# Patient Record
Sex: MALE | Race: WHITE | URBAN - METROPOLITAN AREA
[De-identification: names, ages, dates, MRNs, and addresses within clinical notes are randomized per-mention and may not be internally consistent; named-entity substitution may affect disease eponyms.]

---

## 2020-12-14 ENCOUNTER — OFFICE VISIT (OUTPATIENT)
Dept: FAMILY MEDICINE CLINIC | Age: 30
End: 2020-12-14
Payer: COMMERCIAL

## 2020-12-14 ENCOUNTER — HOSPITAL ENCOUNTER (OUTPATIENT)
Age: 30
Setting detail: SPECIMEN
Discharge: HOME | End: 2020-12-14
Payer: COMMERCIAL

## 2020-12-14 VITALS
HEART RATE: 78 BPM | TEMPERATURE: 97 F | BODY MASS INDEX: 32.27 KG/M2 | WEIGHT: 265 LBS | HEIGHT: 76 IN | OXYGEN SATURATION: 97 %

## 2020-12-14 PROCEDURE — 99214 OFFICE O/P EST MOD 30 MIN: CPT | Performed by: PHYSICIAN ASSISTANT

## 2020-12-14 ASSESSMENT — ENCOUNTER SYMPTOMS
SINUS PAIN: 0
VOMITING: 0
EYES NEGATIVE: 1
ABDOMINAL PAIN: 0
SWOLLEN GLANDS: 0
VISUAL CHANGE: 0
CHANGE IN BOWEL HABIT: 0
SHORTNESS OF BREATH: 0
CHEST TIGHTNESS: 0
NAUSEA: 0
SORE THROAT: 0
SINUS PRESSURE: 0
COUGH: 1

## 2020-12-14 ASSESSMENT — PATIENT HEALTH QUESTIONNAIRE - PHQ9
SUM OF ALL RESPONSES TO PHQ9 QUESTIONS 1 & 2: 0
SUM OF ALL RESPONSES TO PHQ QUESTIONS 1-9: 0
SUM OF ALL RESPONSES TO PHQ QUESTIONS 1-9: 0
1. LITTLE INTEREST OR PLEASURE IN DOING THINGS: 0
2. FEELING DOWN, DEPRESSED OR HOPELESS: 0
SUM OF ALL RESPONSES TO PHQ QUESTIONS 1-9: 0

## 2020-12-14 NOTE — PROGRESS NOTES
[address] [place] [address]  [place] [address]  Phone: [phone]  Fax: [phone] [facility] Name: Katie Blackmon  MRN: [id number]  [place] 1990  Date of evaluation: 12/14/2020  Provider: Tangela Gomez, [facility]       Chief Complaint   Patient presents with    Covid Testing     fever body aches chills pressure in chest and cough X 2 days            HISTORY OF PRESENT ILLNESS  (Location/Symptom, Timing/Onset, Context/Setting, Quality, Duration, Modifying Factors, Severity.)   Katie Blackmon is a 35 y.o. White [1] male who presents to the office for evaluation of      Generalized Body Aches  This is a new problem. The current episode started in the past 7 days. The problem occurs daily. The problem has been waxing and waning. Associated symptoms include chills, congestion, coughing, fatigue and myalgias. Pertinent negatives include no abdominal pain, anorexia, arthralgias, change in bowel habit, chest pain, diaphoresis, fever, headaches, joint swelling, nausea, neck pain, numbness, rash, sore throat, swollen glands, urinary symptoms, vertigo, visual change, vomiting or weakness. Nothing aggravates the symptoms. Nursing Notes were reviewed. REVIEW OF SYSTEMS    (2-9 systems for level 4, 10 or more for level 5)     Review of Systems   Constitutional: Positive for chills and fatigue. Negative for diaphoresis and fever. HENT: Positive for congestion and postnasal drip. Negative for ear discharge, ear pain, sinus pressure, sinus pain and sore throat. Eyes: Negative. Respiratory: Positive for cough. Negative for chest tightness and shortness of breath. Cardiovascular: Negative for chest pain. Gastrointestinal: Negative for abdominal pain, anorexia, change in bowel habit, nausea and vomiting. Genitourinary: Negative. Musculoskeletal: Positive for myalgias. Negative for arthralgias, joint swelling and neck pain. Skin: Negative for rash. Neurological: Negative for vertigo, weakness, numbness and headaches. Except as noted above the remainder of the review of systems was reviewed andnegative. PAST MEDICAL HISTORY   History reviewed. Past Medical History:   Diagnosis Date    Anemia     Asthma     [facility]omy[facility] ([facility] [address] [facility]     Embolism ([facility] [address] [facility]     pulmonary         SURGICAL HISTORY     History reviewed. Past Surgical History:   Procedure Laterality Date    ANTERIOR CRUCIATE LIGAMENT REPAIR      TONSILLECTOMY           CURRENT MEDICATIONS       Current Outpatient Medications   Medication Sig Dispense Refill    albuterol sulfate  (90 BASE) MCG/ACT inhaler Inhale 2 puffs into the lungs every 6 hours as needed for Wheezing      butalbital-acetaminophen-caffeine (FIORICET) -40 MG per tablet Take 1 tablet by mouth every 6 hours as needed for Headaches 20 tablet 0     No current facility-administered medications for this visit. ALLERGIES     Dilaudid [hydromorphone]    FAMILY HISTORY     No family history on file. No family status information on file. SOCIAL HISTORY      reports that he has quit smoking. His smokeless tobacco use includes chew. He reports that he does not drink alcohol or use drugs. PHYSICAL EXAM    (up to 7 for level 4, 8 or more for level 5)     Vitals:    12/14/20 1418   Pulse: 78   Temp: 97 °F (36.1 °C)   TempSrc: Tympanic   SpO2: 97%   Weight: 265 lb (120.2 kg)   Height: 6' 4\" (1.93 m)         Physical Exam  Vitals signs and nursing note reviewed. Constitutional:       Appearance: Normal appearance. HENT:      Head: Normocephalic and atraumatic. Right Ear: External ear normal.      Left Ear: External ear normal.      Nose: Congestion present.       Mouth/Throat:      Mouth: Mucous membranes are moist. Pharynx: Posterior oropharyngeal erythema present. Eyes:      Extraocular Movements: Extraocular movements intact. Conjunctiva/sclera: Conjunctivae normal.      Pupils: Pupils are equal, round, and reactive to light. Cardiovascular:      Rate and Rhythm: Normal rate and regular rhythm. Pulses: Normal pulses. Pulmonary:      Effort: Pulmonary effort is normal.   Abdominal:      Palpations: Abdomen is soft. Skin:     General: Skin is warm and dry. Neurological:      Mental Status: He is alert and oriented to person, place, and time. DIFFERENTIAL DIAGNOSIS:         Dami Adam reviewed the disposition diagnosis with the patient and or their family/guardian. I have answered their questions and given discharge instructions. They voiced understanding of these instructions and did not have anyfurther questions or complaints. PROCEDURES:  Orders Placed This Encounter   Procedures    COVID-19 Ambulatory     Standing Status:   Future     Standing Expiration Date:   12/14/2021     Scheduling Instructions:      Saline media preferred given current shortage of viral transport media but both acceptable     Order Specific Question:   Is this test for diagnosis or screening? Answer:   Diagnosis of ill patient     Order Specific Question:   Symptomatic for COVID-19 as defined by CDC? Answer:   Yes     Order Specific Question:   Date of Symptom Onset     Answer:   12/12/2020     Order Specific Question:   Hospitalized for COVID-19? Answer:   No     Order Specific Question:   Admitted to ICU for COVID-19? Answer:   No     Order Specific Question:   Employed in healthcare setting? Answer:   Unknown     Order Specific Question:   Resident in a congregate (group) care setting? Answer:   Unknown     Order Specific Question:   Pregnant: Answer:   No       No results found for this visit on 12/14/20.     FINALIMPRESSION      Visit Diagnoses and Associated Orders ; When possible, have another member of your household care for your animals while you are sick. If you are sick with COVID-19, avoid contact with your pet, including petting, snuggling, being kissed or licked, and sharing food. If you must care for your pet or be around animals while you are sick, wash your hands before and after you interact with pets and wear a facemask. See COVID-19 and Animals for more information. Other considerations  ? The ill person should eat/be fed in their room if possible. Non-disposable  items used should be handled with gloves and washed with hot water or in a . Clean hands after handling used  items. ? If possible, dedicate a lined trash can for the ill person. Use gloves when removing garbage bags, handling, and disposing of trash. Wash hands after handling or disposing of trash. ? Consider consulting with your local health department about trash disposal guidance if available. Information for Household Members and Caregivers of Someone who is Sick   Call ahead before visiting your doctor   Call ahead: If you have a medical appointment, call the healthcare provider and tell them that you have or may have COVID-19. This will help the healthcare provider's office take steps to keep other people from getting infected or exposed. Wear a facemask if you are sick   ; If you are sick: You should wear a facemask when you are around other people (e.g., sharing a room or vehicle) or pets and before you enter a healthcare provider's office. ; If you are caring for others: If the person who is sick is not able to wear a facemask (for example, because it causes trouble breathing), then people who live with the person who is sick should not stay in the same room with them, or they should wear a facemask if they enter a room with the person who is sick.   Cover your coughs and sneezes ; Cover: Cover your mouth and nose with a tissue when you cough or sneeze.   ; Dispose: Throw used tissues in a lined trash can.   ; Wash hands: Immediately wash your hands with soap and water for at least 20 seconds or, if soap and water are not available, clean your hands with an alcohol-based hand  that contains at least 60% alcohol. Clean your hands often   ; Wash hands: Wash your hands often with soap and water for at least 20 seconds, especially after blowing your nose, coughing, or sneezing; going to the bathroom; and before eating or preparing food.   ; Hand : If soap and water are not readily available, use an alcohol-based hand  with at least 60% alcohol, covering all surfaces of your hands and rubbing them together until they feel dry.   ; Soap and water: Soap and water are the best option if hands are visibly dirty.   ; Avoid touching: Avoid touching your eyes, nose, and mouth with unwashed hands. Handwashing Tips   ; Wet your hands with clean, running water (warm or cold), turn off the tap, and apply soap.  ; Lather your hands by rubbing them together with the soap. Lather the backs of your hands, between your fingers, and under your nails. ; Scrub your hands for at least 20 seconds. Need a timer? Hum the Zanoni from beginning to end twice.  ; Rinse your hands well under clean, running water.  ; Dry your hands using a clean towel or air dry them. Avoid sharing personal household items   ; Do not share: You should not share dishes, drinking glasses, cups, eating utensils, towels, or bedding with other people or pets in your home.   ; Wash thoroughly after use: After using these items, they should be washed thoroughly with soap and water. Clean all high-touch surfaces everyday   ; Clean and disinfect: Practice routine cleaning of high touch surfaces. ; High touch surfaces include counters, tabletops, doorknobs, bathroom fixtures, toilets, phones, keyboards, tablets, and bedside tables.  ; Disinfect areas with bodily fluids: Also, clean any surfaces that may have blood, stool, or body fluids on them.   ; Household : Use a household cleaning spray or wipe, according to the label instructions. Labels contain instructions for safe and effective use of the cleaning product including precautions you should take when applying the product, such as wearing gloves and making sure you have good ventilation during use of the product. Monitor your symptoms   Seek medical attention: Seek prompt medical attention if your illness is worsening     (e.g., difficulty breathing).   ; Call your doctor: Before seeking care, call your healthcare provider and tell them that you have, or are being evaluated for, COVID-19.   ; Wear a facemask when sick: Put on a facemask before you enter the facility. These steps will help the healthcare provider's office to keep other people in the office or waiting room from getting infected or exposed. ; Alert health department: Ask your healthcare provider to call the local or [facility] health department. Persons who are placed under active monitoring or facilitated self-monitoring should follow instructions provided by their local health department or occupational health professionals, as appropriate.  ; Call 911 if you have a medical emergency: If you have a medical emergency and need to call 911, notify the dispatch personnel that you have, or are being evaluated for COVID-19. If possible, put on a facemask before emergency medical services arrive. Patient instructed to return to the office if symptoms worsen, return, or have any other concerns. Patient understands and is agreeable.          Bernadette Connors PA-C 12/14/2020 3:10 PM

## 2020-12-14 NOTE — LETTER
[address] [place] [address]  Phone: [phone]  Fax: [phone]    Brad Botello PA-C        December 14, 2020     Patient: Eual Cancer   YOB: 1990   Date of Visit: 12/14/2020       To Whom it May Concern:    Lanny Solorzano was seen in my clinic on 12/14/2020. He is to remain off work until his [place] comes back negative    If you have any questions or concerns, please don't hesitate to call.     Sincerely,         Brad Botello PA-C

## 2020-12-14 NOTE — PATIENT INSTRUCTIONS
Patient Education        Learning About Coronavirus [phone])  Coronavirus [phone]): Overview  What is coronavirus (UELUT-09)? The coronavirus disease (COVID-19) is caused by a virus. It is an illness that was first found in December 2019. It has since spread worldwide. The virus can cause fever, cough, and trouble breathing. In severe cases, it can cause pneumonia and make it hard to breathe without help. It can cause death. This virus spreads person-to-person through droplets from coughing and sneezing. It can also spread when you are close to someone who is infected. And it can spread when you touch something that has the virus on it, such as a doorknob or a tabletop. Coronaviruses are a large group of viruses. They cause the common cold. They also cause more serious illnesses like Middle East respiratory syndrome (MERS) and severe acute respiratory syndrome (SARS). COVID-19 is caused by a novel coronavirus. That means it's a new type that has not been seen in people before. How is COVID-19 treated? Mild illness can be treated at home, but more serious illness needs to be treated in the hospital. Treatment may include medicines to reduce symptoms, plus breathing support such as oxygen therapy or a ventilator. Other treatments, such as antiviral medicines, may help people who have COVID-19. What can you do to protect yourself from COVID-19? The best way to protect yourself from getting sick is to:  · Avoid areas where there is an outbreak. · Avoid contact with people who may be infected. · Avoid crowds and try to stay at least 6 feet away from other people. · Wash your hands often, especially after you cough or sneeze. Use soap and water, and scrub for at least 20 seconds. If soap and water aren't available, use an alcohol-based hand . · Avoid touching your mouth, nose, and eyes. What can you do to avoid spreading the virus to others?   To help avoid spreading the virus to others: · Wash your hands often with soap or alcohol-based hand sanitizers. · Cover your mouth with a tissue when you cough or sneeze. Then throw the tissue in the trash. · Use a disinfectant to clean things that you touch often. These include doorknobs, remote controls, phones, and handles on your refrigerator and microwave. And don't forget countertops, tabletops, bathrooms, and computer keyboards. · Wear a cloth face cover if you have to go to public areas. If you know or suspect that you have COVID-19:  · Stay home. Don't go to school, work, or public areas. And don't use public transportation, ride-shares, or taxis unless you have no choice. · Leave your home only if you need to get medical care or testing. But call the doctor's office first so they know you're coming. And wear a face cover. · Limit contact with people in your home. If possible, stay in a separate bedroom and use a separate bathroom. · Wear a face cover whenever you're around other people. It can help stop the spread of the virus when you cough or sneeze. · Clean and disinfect your home every day. Use household  and disinfectant wipes or sprays. Take special care to clean things that you grab with your hands. · Self-isolate until it's safe to be around others again. ? If you have symptoms, it's safe when you haven't had a fever for 3 days and your symptoms have improved and it's been at least 10 days since your symptoms started. ? If you were exposed to the virus but don't have symptoms, it's safe to be around others 14 days after exposure. ? Talk to your doctor about whether you also need testing, especially if you have a weakened immune system. When to call for help  Call 911 anytime you think you may need emergency care. For example, call if:  · You have severe trouble breathing. (You can't talk at all.)  · You have constant chest pain or pressure. · You are severely dizzy or lightheaded. · You are confused or can't think clearly. · Your face and lips have a blue color. · You passed out (lost consciousness) or are very hard to wake up. Call your doctor now if you develop symptoms such as:  · Shortness of breath. · Fever. · Cough. If you need to get care, call ahead to the doctor's office for instructions before you go. Make sure you wear a face cover to prevent exposing other people to the virus. Where can you get the latest information? The following health organizations are tracking and studying this virus. Their websites contain the most up-to-date information. [place]w Ao also learn what to do if you think you may have been exposed to the virus. · U.S. Centers for Disease Control and Prevention (CDC): The CDC provides updated news about the disease and travel advice. The website also tells you how to prevent the spread of infection. www.cdc.gov  · World Health Organization [facility]): WHO offers information about the virus outbreaks. WHO also has travel advice. www.who.int  Current as of: July 10, 2020               Content Version: 12.6  © 2006-2020 [employer]. Care instructions adapted under license by [facility] ([facility]). If you have questions about a medical condition or this instruction, always ask your healthcare professional. [place] [address] any warranty or liability for your use of this information. Patient Education        Coronavirus ([id number]): Care Instructions  Overview  The coronavirus disease (COVID-19) is caused by a virus. Symptoms may include a fever, a cough, and shortness of breath. It mainly spreads person-to-person through droplets from coughing and sneezing. The virus also can spread when people are in close contact with someone who is infected. Most people have mild symptoms and can take care of themselves at home. If their symptoms get worse, they may need care in a hospital. Treatment may include medicines to reduce symptoms, plus breathing support such as oxygen therapy or a ventilator. It's important to not spread the virus to others. If you have COVID-19, wear a face cover anytime you are around other people. You need to isolate yourself while you are sick. Leave your home only if you need to get medical care or testing. Follow-up care is a key part of your treatment and safety. Be sure to make and go to all appointments, and call your doctor if you are having problems. It's also a good idea to know your test results and keep a list of the medicines you take. How can you care for yourself at home? · Get extra rest. It can help you feel better. · Drink plenty of fluids. This helps replace fluids lost from fever. Fluids also help ease a scratchy throat. Water, soup, fruit juice, and hot tea with lemon are good choices. · Take acetaminophen (such as Tylenol) to reduce a fever. It may also help with muscle aches. Read and follow all instructions on the label. · Use petroleum jelly on sore skin. This can help if the skin around your nose and lips becomes sore from rubbing a lot with tissues. Tips for self-isolation  · Limit contact with people in your home. If possible, stay in a separate bedroom and use a separate bathroom. · Wear a cloth face cover when you are around other people. It can help stop the spread of the virus when you cough or sneeze. · If you have to leave home, avoid crowds and try to stay at least 6 feet away from other people. · Avoid contact with pets and other animals. · Cover your mouth and nose with a tissue when you cough or sneeze. Then throw it in the trash right away. · Wash your hands often, especially after you cough or sneeze. Use soap and water, and scrub for at least 20 seconds. If soap and water aren't available, use an alcohol-based hand . · Don't share personal household items. These include bedding, towels, cups and glasses, and eating utensils. · [address] in the warmest water allowed for the fabric type, and dry it completely. It's okay to wash other people's laundry with yours. · Clean and disinfect your home every day. Use household  and disinfectant wipes or sprays. Take special care to clean things that you grab with your hands. These include doorknobs, remote controls, phones, and handles on your refrigerator and microwave. And don't forget countertops, tabletops, bathrooms, and computer keyboards. When you can end self-isolation  · If you know or suspect that you have COVID-19, stay in self-isolation until:  ? You haven't had a fever for 3 days, and  ? Your symptoms have improved, and  ? It's been at least 10 days since your symptoms started. · Talk to your doctor about whether you also need testing, especially if you have a weakened immune system. When should you call for help? Call 911 anytime you think you may need emergency care. For example, call if you have life-threatening symptoms, such as:    · You have severe trouble breathing. (You can't talk at all.)     · You have constant chest pain or pressure.     · You are severely dizzy or lightheaded.     · You are confused or can't think clearly.     · Your face and lips have a blue color.     · You pass out (lose consciousness) or are very hard to wake up. Call your doctor now or seek immediate medical care if:    · You have moderate trouble breathing. (You can't speak a full sentence.)     · You are coughing up blood (more than about 1 teaspoon).   · You have signs of low blood pressure. These include feeling lightheaded; being too weak to stand; and having cold, pale, clammy skin. Watch closely for changes in your health, and be sure to contact your doctor if:    · Your symptoms get worse.     · You are not getting better as expected. Call before you go to the doctor's office. Follow their instructions. And wear a cloth face cover. Current as of: July 10, 2020               Content Version: 12.6  © 2006-2020 [employer]. Care instructions adapted under license by [facility] ([facility]). If you have questions about a medical condition or this instruction, always ask your healthcare professional. [place] [address] any warranty or liability for your use of this information. Patient Education        Cough: Care Instructions  Your Care Instructions     A cough is your body's response to something that bothers your throat or airways. Many things can cause a cough. You might cough because of a cold or the flu, bronchitis, or asthma. Smoking, postnasal drip, allergies, and stomach acid that backs up into your throat also can cause coughs. A cough is a symptom, not a disease. Most coughs stop when the cause, such as a cold, goes away. You can take a few steps at home to cough less and feel better. Follow-up care is a key part of your treatment and safety. Be sure to make and go to all appointments, and call your doctor if you are having problems. It's also a good idea to know your test results and keep a list of the medicines you take. How can you care for yourself at home? · Drink lots of water and other fluids. This helps thin the mucus and soothes a dry or sore throat. Honey or lemon juice in hot water or tea may ease a dry cough. · Take cough medicine as directed by your doctor. · Prop up your head on pillows to help you breathe and ease a dry cough.

## 2020-12-17 LAB — SARS-COV-2, NAA: NOT DETECTED
